# Patient Record
(demographics unavailable — no encounter records)

---

## 2025-02-21 NOTE — HISTORY OF PRESENT ILLNESS
[FreeTextEntry1] : Pt with new onset of headaches since June.  Has been more frequent at times and worse at times. No previous history of headaches. Maternal grandmother with preventive tx for migraine. Has same events each time.  May have nausea.  Whole head- non throbbing. No noted light or sound sensitivity. Now she will take 2 Tylenol and asa and will last for a few hours.  Will often go to sleep and improved. Did have 1 week where it was each night. Has had a persistent bladder issue with some frequency, fullness and pain. 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  Musculoskeletal disorder. no supplements of medications. sleep is good.  No significant change in weight.

## 2025-05-22 NOTE — REASON FOR VISIT
[Home] : at home, [unfilled] , at the time of the visit. [Medical Office: ([facility])___] : at the medical office located in  [Telehealth (audio & video)] : This visit was provided via telehealth using real-time 2-way audio visual technology. [Follow-Up: _____] : a [unfilled] follow-up visit

## 2025-05-22 NOTE — HISTORY OF PRESENT ILLNESS
[FreeTextEntry1] : Pt returns today for a follow up appt.  Overall doing well. Had 1 migraine since appt in February. Did try rizatriptan 1x , but it did not help . Has not had another migraine.  Overall feeling good.  Has not done MRI.   [Headache] : headache

## 2025-05-22 NOTE — ASSESSMENT
[FreeTextEntry1] : Pt doing well.  Discussed migraine may returns and to re -try RIzatriptan  Call and follow up prn